# Patient Record
Sex: MALE | Race: WHITE
[De-identification: names, ages, dates, MRNs, and addresses within clinical notes are randomized per-mention and may not be internally consistent; named-entity substitution may affect disease eponyms.]

---

## 2017-11-06 LAB
BASOPHILS # BLD AUTO: 0.1 10*3/UL (ref 0–0.1)
BASOPHILS NFR BLD AUTO: 0.7 % (ref 0–1)
DEPRECATED NEUTROPHILS # BLD AUTO: 4.5 10*3/UL (ref 2.1–6.9)
EOSINOPHIL # BLD AUTO: 0.2 10*3/UL (ref 0–0.4)
EOSINOPHIL NFR BLD AUTO: 2.4 % (ref 0–6)
ERYTHROCYTE [DISTWIDTH] IN CORD BLOOD: 14.7 % (ref 11.7–14.4)
HCT VFR BLD AUTO: 46.5 % (ref 38.2–49.6)
HGB BLD-MCNC: 15.5 G/DL (ref 14–18)
LYMPHOCYTES # BLD: 1.7 10*3/UL (ref 1–3.2)
LYMPHOCYTES NFR BLD AUTO: 24.5 % (ref 18–39.1)
MCH RBC QN AUTO: 28.1 PG (ref 28–32)
MCHC RBC AUTO-ENTMCNC: 33.3 G/DL (ref 31–35)
MCV RBC AUTO: 84.4 FL (ref 81–99)
MONOCYTES # BLD AUTO: 0.5 10*3/UL (ref 0.2–0.8)
MONOCYTES NFR BLD AUTO: 6.8 % (ref 4.4–11.3)
NEUTS SEG NFR BLD AUTO: 65.3 % (ref 38.7–80)
PLATELET # BLD AUTO: 147 X10E3/UL (ref 140–360)
RBC # BLD AUTO: 5.51 X10E6/UL (ref 4.3–5.7)

## 2017-11-08 ENCOUNTER — HOSPITAL ENCOUNTER (OUTPATIENT)
Dept: HOSPITAL 88 - OR | Age: 67
Discharge: HOME | End: 2017-11-08
Attending: INTERNAL MEDICINE
Payer: MEDICARE

## 2017-11-08 DIAGNOSIS — Z01.810: ICD-10-CM

## 2017-11-08 DIAGNOSIS — K20.9: ICD-10-CM

## 2017-11-08 DIAGNOSIS — K21.9: ICD-10-CM

## 2017-11-08 DIAGNOSIS — Z79.4: ICD-10-CM

## 2017-11-08 DIAGNOSIS — E11.9: ICD-10-CM

## 2017-11-08 DIAGNOSIS — I10: ICD-10-CM

## 2017-11-08 DIAGNOSIS — I82.890: Primary | ICD-10-CM

## 2017-11-08 DIAGNOSIS — K29.70: ICD-10-CM

## 2017-11-08 DIAGNOSIS — Z01.812: ICD-10-CM

## 2017-11-08 DIAGNOSIS — K31.7: ICD-10-CM

## 2017-11-08 PROCEDURE — 93005 ELECTROCARDIOGRAM TRACING: CPT

## 2017-11-08 PROCEDURE — 36415 COLL VENOUS BLD VENIPUNCTURE: CPT

## 2017-11-08 PROCEDURE — 85025 COMPLETE CBC W/AUTO DIFF WBC: CPT

## 2017-11-08 PROCEDURE — 43239 EGD BIOPSY SINGLE/MULTIPLE: CPT

## 2017-11-08 NOTE — OPERATIVE REPORT
DATE OF PROCEDURE:  November 08, 2017 



REFERRING PHYSICIAN:  Dr. Julia Kim.  



PROCEDURE PERFORMED:  EGD with biopsies and polypectomy.  



INDICATIONS FOR  EGD:  Patient with history of splenic vein thrombosis.  

EGD is being carried out to check for gastric varices.



MEDICATION:  Patient was done under MAC.  Please see anesthesiologist's 

note.



PROCEDURE:  With the patient in left lateral decubitus position, flexible 

fiberoptic Olympus gastroscope was introduced into the esophagus under 

direct visualization without any difficulty.  There was some patchy 

erythema noted in the distal esophagus.  Minute tongues of velvety red 

mucosa were noted to extend proximally from the GE junction and biopsies 

were obtained to rule out Peraza's.  The scope was then advanced with ease 

into the stomach.  Mucosa overlying the antrum revealed some patchy intense 

erythema and low-grade to moderate edema, and biopsies were obtained and 

sent to stain for H. pylori.  Some hyperplastic appearing polyps were also 

noted in the body of the stomach and some were partially excised with a 

cold biopsy forceps.  The pylorus was of normal contour and shape.  Was 

intubated with ease, and the scope was advanced all the way to the 2nd 

portion of the duodenum.  The scope was then withdrawn slowly, and the 

mucosa overlying the proximal 2nd portion as well as the duodenal bulb 

appeared to be within normal limits.  The scope was then withdrawn back 

into the stomach and retroflexed, and the mucosa overlying the fundus and 

the cardia appeared to be within normal limits.  The scope was then 

straightened out.  The stomach was decompressed.  The scope was 

subsequently withdrawn.  Patient tolerated the procedure well.



IMPRESSION  

1. Distal esophagitis, mild.

2. Rule out Peraza's esophagus.

3. Gastritis biopsied.  Biopsy sent stain for Helicobacter pylori.

4. Gastric polyps.  Some partially excised with a cold biopsy forceps.

5. No evidence of gastric varices.    



PLAN:  Follow up histology.  Continue Prevacid 30 mg 1 p.o. a.c. b.i.d.





DD:  11/08/2017 10:43

DT:  11/08/2017 11:03

Job#:  [id number] ERICKSON



cc:JULIA KIM MD

## 2018-11-09 LAB
BASOPHILS # BLD AUTO: 0.1 10*3/UL (ref 0–0.1)
BASOPHILS NFR BLD AUTO: 0.7 % (ref 0–1)
DEPRECATED NEUTROPHILS # BLD AUTO: 5.8 10*3/UL (ref 2.1–6.9)
EOSINOPHIL # BLD AUTO: 0.2 10*3/UL (ref 0–0.4)
EOSINOPHIL NFR BLD AUTO: 2.4 % (ref 0–6)
ERYTHROCYTE [DISTWIDTH] IN CORD BLOOD: 14.3 % (ref 11.7–14.4)
HCT VFR BLD AUTO: 47.6 % (ref 38.2–49.6)
HGB BLD-MCNC: 16.2 G/DL (ref 14–18)
LYMPHOCYTES # BLD: 2.4 10*3/UL (ref 1–3.2)
LYMPHOCYTES NFR BLD AUTO: 25.9 % (ref 18–39.1)
MCH RBC QN AUTO: 28.9 PG (ref 28–32)
MCHC RBC AUTO-ENTMCNC: 34 G/DL (ref 31–35)
MCV RBC AUTO: 84.8 FL (ref 81–99)
MONOCYTES # BLD AUTO: 0.6 10*3/UL (ref 0.2–0.8)
MONOCYTES NFR BLD AUTO: 6.8 % (ref 4.4–11.3)
NEUTS SEG NFR BLD AUTO: 63.9 % (ref 38.7–80)
PLATELET # BLD AUTO: 179 X10E3/UL (ref 140–360)
RBC # BLD AUTO: 5.61 X10E6/UL (ref 4.3–5.7)

## 2018-11-14 ENCOUNTER — HOSPITAL ENCOUNTER (OUTPATIENT)
Dept: HOSPITAL 88 - OR | Age: 68
Discharge: HOME | End: 2018-11-14
Attending: INTERNAL MEDICINE
Payer: MEDICARE

## 2018-11-14 VITALS — DIASTOLIC BLOOD PRESSURE: 57 MMHG | SYSTOLIC BLOOD PRESSURE: 94 MMHG

## 2018-11-14 DIAGNOSIS — Z86.010: ICD-10-CM

## 2018-11-14 DIAGNOSIS — Z12.11: Primary | ICD-10-CM

## 2018-11-14 DIAGNOSIS — D12.0: ICD-10-CM

## 2018-11-14 DIAGNOSIS — K29.70: ICD-10-CM

## 2018-11-14 DIAGNOSIS — Z01.810: ICD-10-CM

## 2018-11-14 DIAGNOSIS — K86.1: ICD-10-CM

## 2018-11-14 DIAGNOSIS — K64.8: ICD-10-CM

## 2018-11-14 DIAGNOSIS — I10: ICD-10-CM

## 2018-11-14 DIAGNOSIS — K21.0: ICD-10-CM

## 2018-11-14 DIAGNOSIS — K57.30: ICD-10-CM

## 2018-11-14 DIAGNOSIS — K63.5: ICD-10-CM

## 2018-11-14 DIAGNOSIS — Z01.812: ICD-10-CM

## 2018-11-14 PROCEDURE — 85025 COMPLETE CBC W/AUTO DIFF WBC: CPT

## 2018-11-14 PROCEDURE — 36415 COLL VENOUS BLD VENIPUNCTURE: CPT

## 2018-11-14 PROCEDURE — 88305 TISSUE EXAM BY PATHOLOGIST: CPT

## 2018-11-14 PROCEDURE — 82948 REAGENT STRIP/BLOOD GLUCOSE: CPT

## 2018-11-14 PROCEDURE — 45384 COLONOSCOPY W/LESION REMOVAL: CPT

## 2018-11-14 PROCEDURE — 45385 COLONOSCOPY W/LESION REMOVAL: CPT

## 2018-11-14 PROCEDURE — 93005 ELECTROCARDIOGRAM TRACING: CPT

## 2018-11-14 PROCEDURE — 45378 DIAGNOSTIC COLONOSCOPY: CPT

## 2018-11-14 NOTE — OPERATIVE REPORT
DATE OF PROCEDURE:  November 14, 2018 



REFERRING PHYSICIAN:  Dr. GEORGE Kim  



PROCEDURE PERFORMED:  Colonoscopy and polypectomy.  



INDICATIONS FOR COLONOSCOPY:  Surveillance colonoscopy, personal history of 

colon polyps.



MEDICATION:  Patient was done under MAC.  Please see anesthesiologist's 

note.



PROCEDURE:  With the patient in the left lateral decubitus position, the 

flexible fiberoptic Olympus colonoscope was inserted into the rectum with 

ease and advanced all the way to the cecum.  One polyp was snared from the 

cecum.  Diverticular disease was noted throughout the colon.  One polyp was 

hot biopsied from the ascending, and two polyps were hot biopsied from the 

transverse colon.  Other than for diverticulosis, the descending and 

sigmoid appeared to be within normal limits.  The scope was then 

retroflexed into the distal rectum, and moderate-size internal hemorrhoids 

were noted, none of which was actively bleeding.  The scope was then 

straightened out.  It was subsequently withdrawn.  Patient tolerated the 

procedure well.



IMPRESSION

1. Cecal polyp, snared.

2. Ascending colon polyp, hot biopsied.

3. Transverse colon polyps times 2, hot biopsied.

4. Pandiverticulosis.  

5. Internal hemorrhoids, none actively bleeding.  



PLAN:  Follow up histology.  Initiate high-fiber, low-fat diet.  Initiate 

high-fiber supplement.  

Patient will need a followup colonoscopy in 3 years.  







DD:  11/14/2018 09:03

DT:  11/14/2018 11:56

Job#:  [id number] 



cc:JULIA KIM MD

## 2018-11-14 NOTE — XMS REPORT
Clinical Summary

                             Created on: 2018



Rafita Maher

External Reference #: [id number]

: 1950

Sex: Male



Demographics







                          Address                   [address] [place]  [address]

 

                          Home Phone                [phone]

 

                          Preferred Language        English

 

                          Marital Status            

 

                          Rastafarian Affiliation     Unknown

 

                          Race                      White

 

                          Ethnic Group              Non-





Author







                          Author                    [facility]

 

                          Organization              [facility]

 

                          Address                   Unknown

 

                          Phone                     Unavailable







Support







                Name            Relationship    Address         Phone

 

                Olga Conner    [employer]            Unknown         [phone]







Care Team Providers







                    Care Team Member Name    Role                Phone

 

                    Arcihe Vera MD    PCP                 [phone]







Allergies

No Known Allergies



Medications







                          End Date                  Status



              Medication     Sig          Dispensed     Refills      Start  



                                         Date  

 

                                                    Active



                     insulin GLARGINE (LANTUS     Inject 20           0   



                           SOLOSTAR) 100 unit/mL     Units under     



                           injection (pen)           the skin     



                                         every     



                                         morning.     

 

                                                    Active



                     olmesartan-amlodipin-hcth     Take 1 tablet       0   



                           iazid (TRIBENZOR)         by mouth     



                           40-5-12.5 mg tablet       every     



                                         morning.     

 

                                                    Active



                     lansoprazole (PREVACID)     Take 30 mg by       0   



                           30 MG capsule             mouth 2 (two)     



                                         times a day.     

 

                                                    Active



                     pancrelipase,       Take 24,000         0   



                           lipase-protease-amylase,     Units by     



                           (CREON) 24,000-76,000     mouth 3     



                           -120,000 unit             (three) times     



                           capsule,delayed           a day with     



                           release(DR/EC) capsule     meals. TAKES     



                                         3 TABLETS 3     



                                         TIMES A DAY     



                                         WITH MEALS     

 

                                                    Active



                     testosterone cypionate     Inject 200          0   



                           200 mg/mL kit             mg/mL into     



                                         the shoulder,     



                                         thigh, or     



                                         buttocks     



                                         every 14     



                                         (fourteen)     



                                         days. EVERY     



                                          AND      



                                         OF THE MONTH     

 

                                                    Active



                     UNABLE TO FIND      Take 3              0   



                                         tablets by     



                                         mouth every     



                                         morning.     



                                         Nugenix     

 

                                                    Active



                     BENEFIBER, GUAR GUM, ORAL     Take 2 Doses        0   



                                         by mouth     



                                         every     



                                         morning. 2     



                                         TBSP     

 

                                                    Active



                     MULTIVITAMIN ORAL     Take 1 tablet       0   



                                         by mouth     



                                         every     



                                         morning.     

 

                                                    Active



                     aspirin (ECOTRIN) 81 MG     Take 81 mg by       0   



                           enteric coated tablet     mouth every     



                                         morning.     

 

                                                    Active



                     glucosamine-chondroitin     Chew 2              0   



                           750-600 mg                tablets every     



                           tablet,chewable           morning.     

 

                                                    Active



                     omega-3 fatty acids-fish     Take 2              0   



                           oil (FISH OIL) 360-1,200     tablets by     



                           mg capsule                mouth 2 (two)     



                                         times a day.     

 

                                                    Active



                     insulin GLARGINE (LANTUS     Inject 60           0   



                           SOLOSTAR) 100 unit/mL     Units under     



                           injection (pen)           the skin     



                                         nightly.     

 

                                                    Active



                     insulin lispro (HUMALOG)     Inject 15           0   



                           100 unit/mL injection pen     Units under     



                                         the skin     



                                         daily before     



                                         dinner.     

 

                                                    Active



                     insulin lispro (HUMALOG)     Inject 10           0   



                           100 unit/mL injection pen     Units under     



                                         the skin     



                                         daily before     



                                         breakfast.     

 

                                                    Active



                     ERGOCALCIFEROL, VITAMIN     Take 1 tablet       0   



                           D2, (VITAMIN D2 ORAL)     by mouth     



                                         every     



                                         morning.     

 

                          2017                Discontinued



                     insulin lispro (HumaLOG)     Inject 10           0   



                           100 unit/mL injection     Units under     



                                         the skin     



                                         every     



                                         morning.     

 

                          2017                Discontinued



                     cyanocobalamin 1,000     Inject 1,000        0   



                           mcg/mL injection          mcg into the     



                                         shoulder,     



                                         thigh, or     



                                         buttocks     



                                         once.     

 

                          2018                



              HYDROcodone-acetaminophen     Take 1 tablet     31 tablet     0              



                     (NORCO) 5-325 mg per     by mouth            7  



                           tablet                    every 6 (six)     



                                         hours as     



                                         needed for     



                                         moderate pain     



                                         for up to 21     



                                         days. Max     



                                         Daily Amount:     



                                         4 tablets     







Active Problems







 



                           Problem                   Noted Date

 

 



                           Status post umbilical hernia repair, follow-up exam     2017

 

 



                                         Last Assessment & Plan:



                                         The patient is doing well status post laparoscopic umbilical hernia repair



                                         with mesh.  There is no longer any lifting restrictions.  The patient will



                                         return to clinic as needed.

 

 



                           Diabetes                  2017

 

 



                           High blood pressure       2017

 

 



                           GERD (gastroesophageal reflux disease)     2017

 

 



                           Pancreatitis              2017

 

 



                           Splenic vein thrombosis     2017

 

 



                                         Last Assessment & Plan:



                                         Patient has known congestive splenomegaly with extensive collateralization



                                         from splenic vein thrombosis and is considering splenectomy at the same



                                         time as umbilical hernia repair. We will obtain the MRI disk at [facility]



                                         for this report and discuss these MRI finding with his Gastroenterologist,



                                         Dr. James Diggs of [place] [phone].



                                         .







Resolved Problems







  



                     Problem             Noted Date          Resolved Date

 

  



                     Umbilical hernia without obstruction and without gangrene     2017



 

 



                                         Last Assessment & Plan:



                                         Patient has a small, non-painful, reducible umbilical hernia. We discussed



                                         the risks, benefits and alternatives to laparoscopic surgical repair of



                                         this hernia and he wishes to proceed.







Encounters







                          Care Team                 Description



                     Date                Type                Specialty  

 

                                        



Marques Tomas MD                      Status post umbilical hernia repair, follow-up exam (Primary 

Dx)



                     2018          Office Visit        General Surgery  

 

                                        



Marques Tomas MD                      Status post umbilical hernia repair, follow-up exam (Primary 

Dx)



                     2017          Office Visit        General Surgery  

 

                                        



Marques Tomas MD                      REPAIR, HERNIA, UMBILICAL, LAPAROSCOPIC



                     2017          Surgery             General Surgery  

 

                                        



Josue Ron Jr., MD                  



                     2017          Anesthesia          General Surgery  



                                         Event   

 

                                        



Marques Tomas MD                       



                     2017          Hospital            General Surgery  



                                         Encounter   

 

                                        



Marques Tomas MD                      Preop testing (Primary Dx); 

Umbilical hernia without obstruction or gangrene



                     2017          Pre-Admit           Pre-Admission Testing  



                                         Testing   



                                         Appointment   



after 2017



Family History







   



                 Medical History     Relation        Name            Comments

 

   



                           No Known Problems         Brother  

 

   



                           No Known Problems         Brother  

 

   



                           Diabetes                  Brother  

 

   



                           Kidney disease            Daughter  

 

   



                           No Known Problems         Daughter  

 

   



                           Brain cancer              Father  

 

   



                           Lung cancer               Father  

 

   



                           No Known Problems         Maternal  



                                         Grandfather  

 

   



                           No Known Problems         Maternal  



                                         Grandmother  

 

   



                           Blood Clots               Mother  

 

   



                           No Known Problems         Paternal  



                                         Grandfather  

 

   



                           No Known Problems         Paternal  



                                         Grandmother  

 

   



                           Diabetes                  Sister  









   



                 Relation        Name            Status          Comments

 

   



                           Brother                   Alive 

 

   



                           Brother                   Alive 

 

   



                           Brother                   Alive 

 

   



                           Daughter                  Alive 

 

   



                           Daughter                  Alive 

 

   



                           Father                     

 

   



                           Maternal Grandfather       

 

   



                           Maternal Grandmother       

 

   



                           Mother                     

 

   



                           Paternal Grandfather       

 

   



                           Paternal Grandmother       

 

   



                           Sister                    Alive 







Social History







                                        Date



                 Tobacco Use     Types           Packs/Day       Years Used 

 

                                        Quit: 



                 Former Smoker     Cigarettes      1.5             8 

 

    



                                         Smokeless Tobacco: Never   



                                         Used   









   



                 Alcohol Use     Drinks/Week     oz/Week         Comments

 

   



                           No                        QUIT , - used to be heavy drinker.









 



                           Sex Assigned at Birth     Date Recorded

 

 



                                         Not on file 









                                        Industry



                           Job Start Date            Occupation 

 

                                        Not on file



                           Not on file               Not on file 









                                        Travel End



                           Travel History            Travel Start 

 





                                         No recent travel history available.







Last Filed Vital Signs







                                        Time Taken



                           Vital Sign                Reading 

 

                                        2018 10:37 AM CST



                           Blood Pressure            136/70 

 

                                        2018 10:37 AM CST



                           Pulse                     85 

 

                                        2018 10:37 AM CST



                           Temperature               36.5 C (97.7 F) 

 

                                        2018 10:37 AM CST



                           Respiratory Rate          16 

 

                                        2017 12:04 PM CST



                           Oxygen Saturation         96% 

 

                                        -



                           Inhaled Oxygen            - 



                                         Concentration  

 

                                        2018 10:37 AM CST



                           Weight                    115 kg (252 lb 8 oz) 

 

                                        2018 10:37 AM CST



                           Height                    182.9 cm (6') 

 

                                        2018 10:37 AM CST



                           Body Mass Index           34.25 







Plan of Treatment







   



                 Health Maintenance     Due Date        Last Done       Comments

 

   



                           DIABETIC RETINAL EYE EXAM     1950  

 

   



                           DIABETIC FOOT EXAM        1961  

 

   



                           URINE MICROALBUMIN        1961  

 

   



                           COLON CANCER SCREENING     2001  

 

   



                           SHINGRIX VACCINE (1 of 2)     2001  

 

   



                           ZOSTER VACCINE            2011  

 

   



                           PNEUMOCOCCAL              2016  



                                         POLYSACCHARIDE VACCINE   



                                         AGE 65 AND OVER   

 

   



                           PNEUMOCOCCAL-13           2016  

 

   



                     INFLUENZA VACCINE     2018          10/25/2011, 2010, 2010, 



                                         Additional history exists 







Implants







                    Device Identifier    Shelf Expiration Date    Model / Serial / Lot



                 Implanted       Type            Area            Manufactur   



                                         er   

 

                                        2018          [id number] /

 /

[id number]



                 System Fixation Laparo Absorb 30     Surgical        N/A: N/A        [employer]   



                           Fastnr Optifix - [id number]     Implants;     



                           Implanted: 2017 (Quantity not     Expanders;     



                           on file)                  Extenders;     



                                         Surgical     



                                         Wires     

 

                                        2019          [id number] /

 /

[id number]



                 Mesh Hrnia Rpr Ventralight St 4x6in     Surgical        N/A: N/A        [employer]   



                           Elptcl Ppe Ventrl - [id number]     Mesh or     



                           Implanted: 2017 (Quantity not     Tissue     



                           on file)                  Barrier     



                                         Products     







Procedures







                                        Comments



                 Procedure Name     Priority        Date/Time       Associated Diagnosis 

 

                                        



Results for this procedure are in the results section.



                     POC GLUCOSE         Routine             2017  



                                         11:55 AM CST  

 

                                        



Results for this procedure are in the results section.



                     POC GLUCOSE         Routine             2017  



                                         10:58 AM CST  

 

                                         



                     MA AN ELECTIVE      Routine             2017  



                           ENDOTRACHEAL AIRWAY       8:41 AM CST  

 

  



                                         Procedure



                                         Note -



                                         Josue Ron Jr., MD -



                                         2017



                                         8:40 AM



                                         CST



                                         Airway



                                         Date/Time:



                                         2017



                                         8:19 AM



                                         Performed



                                         by:



                                         JOSUE RON JR



                                         Authorized



                                         by:



                                         JOSUE RON JR





                                         Location:



                                         OR



                                         Urgency:



                                         Elective



                                         Difficult



                                         Airway: No



                                         Anesthesio



                                         logist:



                                         JOSUE RON JR



                                         Performed



                                         by:



                                         anesthesio



                                         logist



                                         Preoxygena



                                         felipe with



                                         100% O2:



                                         Yes



                                         C-spine



                                         Precaution



                                         s



                                         Maintained



                                         Throughout



                                         : Yes



                                         Mask



                                         Ventilatio



                                         n:  Easy



                                         mask



                                         Final



                                         Airway



                                         Type:



                                         Endotrache



                                         al airway



                                         Final



                                         Endotrache



                                         al Airway:



                                         ETT



                                         Cuffed:



                                         Yes



                                         Technique



                                         Used:



                                         Direct



                                         laryngosco



                                         py



                                         Devices/Me



                                         thods Used



                                         in



                                         Placement:



                                         Intubatin



                                         g stylet



                                         Insertion



                                         Site:



                                         Oral



                                         ETT Size



                                         (mm):  8.0



                                         Cuff at



                                         minimum



                                         occlusion



                                         pressure:



                                         Yes



                                         Measured



                                         from:



                                         Teeth



                                         ETT to



                                         Teeth



                                         (cm):  23



                                         Placement



                                         Verified



                                         by: CO2



                                         detection



                                         and direct



                                         visualizat



                                         ion



                                         Laryngosco



                                         pic view:



                                         Grade I -



                                         full view



                                         of glottis



                                         Rapid



                                         Sequence



                                         Induction



                                         (RSI): No





                                         Modified



                                         RSI: No



                                         Number of



                                         Attempts



                                         at



                                         Approach:



                                         1



 

                                         



                     REPAIR, HERNIA,      2017          Umbilical hernia without 



                     UMBILICAL, LAPAROSCOPIC      8:00 AM CST         obstruction and without 



                                         gangrene 

 

                                        



Results for this procedure are in the results section.



                     POC GLUCOSE         Routine             2017  



                                         7:30 AM CST  

 

                                        



Results for this procedure are in the results section.



                 ECG PRE/POST OP     Routine         2017      Preop testing 



                                         11:45 AM CST  

 

                                        



Results for this procedure are in the results section.



                 CBC HEMOGRAM     Routine         2017      Umbilical hernia without 



                           10:50 AM CST              obstruction or gangrene 

 

                                        



Results for this procedure are in the results section.



                 HEMOGLOBIN A1C     Routine         2017      Preop testing 



                                         10:31 AM CST  

 

                                        



Results for this procedure are in the results section.



                 HEPATIC FUNCTION PANEL     Routine         2017      Preop testing 



                                         10:31 AM CST  

 

                                        



Results for this procedure are in the results section.



                     ZZESTIMATED GFR     Routine             2017  



                                         10:29 AM CST  

 

                                        



Results for this procedure are in the results section.



                 BASIC METABOLIC PANEL     Routine         2017      Umbilical hernia without 



                           10:29 AM CST              obstruction or gangrene 



after 2017



Results

* POC glucose (2017 11:55 AM CST)



Only the most recent of 3 results within the time period is included.





   



                 Component       Value           Ref Range       Performed At

 

   



                 POC glucose     (H)             65 - 99 mg/dL     [facility] DEPARTMENT OF



                           Comment:                  PATHOLOGY AND



                           No Action Needed          GENOMIC MEDICINE



                                         Meter ID: [id number]  



                                         : Co My Dien T  









   



                 Performing Organization     Address         City/[facility]     Phone Number

 

   



                     [facility] DEPARTMENT OF     [address] [place] [address] 



                                         PATHOLOGY AND GENOMIC   



                                         MEDICINE   





* ECG Pre/Post Op (2017 11:45 AM CST)





   



                 Component       Value           Ref Range       Performed At

 

   



                           Ventricular rate          [facility] MUSE

 

   



                           Atrial rate               [facility] MUSE

 

   



                           MA interval               [facility] MUSE

 

   



                           QRSD interval             [facility] MUSE

 

   



                           QT interval               [facility] MUSE

 

   



                           QTC interval              [facility]

 

   



                           P axis 1                  [facility]

 

   



                           QRS axis 1                [facility] MUSE

 

   



                           T wave axis               [facility] MUSE

 

   



                           EKG impression            [facility] MUSE









   



                 Performing Organization     Address         [facility]     Phone Number

 

   



                     [facility] MUSE            [address] [place] [address] 





* CBC hemogram (2017 10:50 AM CST)





   



                 Component       Value           Ref Range       Performed At

 

   



                     WBC                 4.50 - 11.00 k/uL     [facility] DEPARTMENT OF



                                         PATHOLOGY AND



                                         GENOMIC MEDICINE

 

   



                     RBC                 4.40 - 6.00 m/uL     [facility] DEPARTMENT OF



                                         PATHOLOGY AND



                                         GENOMIC MEDICINE

 

   



                     HGB                 14.0 - 18.0 g/dL     [facility] DEPARTMENT OF



                                         PATHOLOGY AND



                                         GENOMIC MEDICINE

 

   



                     HCT                 41.0 - 51.0 %       [facility] DEPARTMENT OF



                                         PATHOLOGY AND



                                         GENOMIC MEDICINE

 

   



                     MCV                 82.0 - 100.0 fL     [facility] DEPARTMENT OF



                                         PATHOLOGY AND



                                         GENOMIC MEDICINE

 

   



                     MCH                 27.0 - 34.0 pg      [facility] DEPARTMENT OF



                                         PATHOLOGY AND



                                         GENOMIC MEDICINE

 

   



                     MCHC                31.0 - 37.0 g/dL     [facility] DEPARTMENT OF



                                         PATHOLOGY AND



                                         GENOMIC MEDICINE

 

   



                     RDW - SD            37.0 - 55.0 fL      [facility] DEPARTMENT OF



                                         PATHOLOGY AND



                                         GENOMIC MEDICINE

 

   



                     MPV                 8.8 - 13.2 fL       [facility] DEPARTMENT OF



                                         PATHOLOGY AND



                                         GENOMIC MEDICINE

 

   



                     Platelet count      150 - 400 k/uL      [facility] DEPARTMENT OF



                                         PATHOLOGY AND



                                         GENOMIC MEDICINE

 

   



                     Nucleated RBC       /100 WBC            [facility] DEPARTMENT OF



                                         PATHOLOGY AND



                                         GENOMIC MEDICINE













                                         Specimen

 





                                         Blood









   



                 Performing Organization     Address         [facility]     Phone Number

 

   



                     [place] [address] 



                                         PATHOLOGY AND GENOMIC   



                                         MEDICINE   





* Hemoglobin A1c (2017 10:31 AM CST)





   



                 Component       Value           Ref Range       Performed At

 

   



                 Hemoglobin A1C     (H)             4.0 - 5.6 %     [facility] DEPARTMENT OF



                           Comment:                  PATHOLOGY AND



                           HbA1c cutoffs for diagnosing      [facility]



                                         diabetes:  



                                         4.0% - 5.6%=normal  



                                         5.7% - 6.4%=increased risk for  



                                         diabetes (prediabetes)

  



                                         >=6.5%=diabetes  



                                         Goals for glycemic control  



                                         (ADA 2016)  



                                         < 7.0%Target for  



                                         nonpregnant adults with  



                                         diabetes.  



                                         More or less stringent targets  



                                         may be appropriate for  



                                         individual patients.  



                                         <7.5% Target for Children  



                                         and adolescents with type 1  



                                         diabetes.  













                                         Specimen

 





                                         Blood









   



                 Performing Organization     Address         [facility]     Phone Number

 

   



                     [place] [address] 



                                         PATHOLOGY AND GENOMIC   



                                         MEDICINE   





* Hepatic function panel (2017 10:31 AM CST)





   



                 Component       Value           Ref Range       Performed At

 

   



                     Albumin             3.5 - 5.0 g/dL      [facility] DEPARTMENT OF



                                         PATHOLOGY AND



                                         GENOMIC MEDICINE

 

   



                     Total bilirubin      0.0 - 1.2 mg/dL     [facility] DEPARTMENT OF



                                         PATHOLOGY AND



                                         GENOMIC MEDICINE

 

   



                     Bilirubin direct      0.0 - 0.3 mg/dL     [facility] DEPARTMENT OF



                                         PATHOLOGY AND



                                         GENOMIC MEDICINE

 

   



                     Alkaline phosphatase      40 - 129 U/L        [facility] DEPARTMENT OF



                                         PATHOLOGY AND



                                         GENOMIC MEDICINE

 

   



                 Protein         Comment:        6.3 - 8.3 g/dL     [facility] DEPARTMENT OF



                                 PATHOLOGY AND



                            4.6-7.0 g/dL        GENOMIC MEDICINE



                                         1  



                                         week  



                                          4.4-7.6 g/dL  



                                         7  



                                         months-1year  



                                         5.1-7.3 g/dL  



                                         1-2  



                                         years5.6-7  



                                         .5 g/dL  



                                         >3  



                                         years6.0-8  



                                         .0 g/dL  



                                           



                                          6.3-8.3 g/dL  

 

   



                     ALT                 5 - 50 U/L          [facility] DEPARTMENT OF



                                         PATHOLOGY AND



                                         GENOMIC MEDICINE

 

   



                     AST                 10 - 50 U/L         [facility] DEPARTMENT OF



                                         PATHOLOGY AND



                                         GENOMIC MEDICINE













                                         Specimen

 





                                         Plasma specimen









   



                 Performing Organization     Address         [facility]     Phone Number

 

   



                     [place] [address] 



                                         PATHOLOGY AND [facility]   



                                         MEDICINE   





* Estimated GFR (2017 10:29 AM CST)





   



                 Component       Value           Ref Range       Performed At

 

   



                     GFR Non Af Amer      mL/min/1.73 m2      [facility] DEPARTMENT OF



                                         PATHOLOGY AND



                                         GENOMIC MEDICINE

 

   



                 GFR Af Amer     Comment:        mL/min/1.73 m2     [facility] DEPARTMENT OF



                           Chronic kidney disease: <60      PATHOLOGY AND



                           mL/min/1.73m2             [facility] MEDICINE



                                         Kidney failure: <15  



                                         mL/min/1.73m2  



                                         The estimated GFR is  



                                         calculated from the  



                                         IDMS-traceable Modification of  



                                         Diet  



                                         in Renal Disease Equation. The  



                                         accuracy of the calculation is  



                                         poor when the  



                                         creatinine is normal.  



                                         Calculated values >90  



                                         mL/min/1.73m2 are not  



                                         reported.  



                                         This equation has not been  



                                         validated in children (<18  



                                         years), pregnant  



                                         women, the elderly (>70  



                                         years), or ethnic groups other  



                                         than Caucasians and  



                                          Americans.  













                                         Specimen

 





                                         Plasma specimen









   



                 Performing Organization     Address         [facility]     Phone Number

 

   



                     [facility]     [address] [place] [address] 



                                         PATHOLOGY AND [facility]   



                                         MEDICINE   





* Basic metabolic panel (2017 10:29 AM CST)





   



                 Component       Value           Ref Range       Performed At

 

   



                     Sodium              135 - 148 mEq/L     [facility] DEPARTMENT OF



                                         PATHOLOGY AND



                                         GENOMIC MEDICINE

 

   



                     Potassium           3.5 - 5.0 mEq/L     [facility] DEPARTMENT OF



                                         PATHOLOGY AND



                                         GENOMIC MEDICINE

 

   



                     Chloride            98 - 112 mEq/L      [facility] DEPARTMENT OF



                                         PATHOLOGY AND



                                         GENOMIC MEDICINE

 

   



                     CO2                 24 - 31 mEq/L       [facility] DEPARTMENT OF



                                         PATHOLOGY AND



                                         GENOMIC MEDICINE

 

   



                 Anion gap       Comment:        7 - 15 mEq/L     [facility] DEPARTMENT OF



                           Starting from       PATHOLOGY AND



                           , anion gap calculation      GENOMIC MEDICINE



                                         no longer incorporates  



                                         potassium. Please note the  



                                         change.  

 

   



                     BUN                 8 - 23 mg/dL        [facility] DEPARTMENT OF



                                         PATHOLOGY AND



                                         GENOMIC MEDICINE

 

   



                     Creatinine          0.7 - 1.2 mg/dL     [facility] DEPARTMENT OF



                                         PATHOLOGY AND



                                         GENOMIC MEDICINE

 

   



                 Glucose         (H)             65 - 99 mg/dL     [facility] DEPARTMENT OF



                                         PATHOLOGY AND



                                         GENOMIC MEDICINE

 

   



                     Calcium             8.8 - 10.2 mg/dL     [facility] DEPARTMENT OF



                                         PATHOLOGY AND



                                         GENOMIC MEDICINE













                                         Specimen

 





                                         Plasma specimen









   



                 Performing Organization     Address         City/State/Zipcode     Phone Number

 

   



                     [facility] DEPARTMENT OF     [address] [place] [address] 



                                         PATHOLOGY AND GENOMIC   



                                         MEDICINE   





after 2017



Insurance







     



            Payer      Benefit     Subscriber ID     Type       Phone      Address



                                         Plan /    



                                         Group    

 

     



                 [employer] MEDICARE     [employer]          xxxxxxxxx       PPO  



                                         MEDICARE    



                                         PPO/PFFS/E    



                                         [facility]    









     



            Guarantor Name     Account     Relation to     Date of     Phone      Billing Address



                     Type                Patient             Birth  

 

     



            Rafita Maher     Personal/F     Self       1950     [phone] [address]               ([place])              [place] [address]







Advance Directives





Patient has advance care planning documents on file. For more information, josé miguel cummins contact:



Ray Michael



[address] [place] [address]

## 2018-11-27 ENCOUNTER — HOSPITAL ENCOUNTER (OUTPATIENT)
Dept: HOSPITAL 88 - MRI | Age: 68
End: 2018-11-27
Attending: INTERNAL MEDICINE
Payer: MEDICARE

## 2018-11-27 DIAGNOSIS — K86.1: Primary | ICD-10-CM

## 2018-11-27 LAB
BUN SERPL-MCNC: 17 MG/DL (ref 7–26)
BUN/CREAT SERPL: 20 (ref 6–25)
EGFRCR SERPLBLD CKD-EPI 2021: > 60 ML/MIN (ref 60–?)

## 2018-11-27 PROCEDURE — 36415 COLL VENOUS BLD VENIPUNCTURE: CPT

## 2018-11-27 PROCEDURE — 82565 ASSAY OF CREATININE: CPT

## 2018-11-27 PROCEDURE — 74183 MRI ABD W/O CNTR FLWD CNTR: CPT

## 2018-11-27 PROCEDURE — 84520 ASSAY OF UREA NITROGEN: CPT

## 2018-11-28 NOTE — DIAGNOSTIC IMAGING REPORT
History: Chronic pancreatitis



Comparison: MRI abdomen 7/13/2017.



Technique: Multiplanar, multisequence images of the abdomen were obtained

before and after the administration of  20 cc of gadolinium.  3D volume

rendered reformation images of the biliary tree were performed.  



Findings: 

Biliary tree: The intrahepatic and intrahepatic bile ducts, cystic duct, and

common bile duct are normal in morphology.



Pancreas duct: Chronic dilatation of the main pancreas duct to 9 mm. 



Liver: Normal T2 signal. The right lobe measures 19 cm in length, stable. There

is no evidence of mass. 

Gallbladder: Present. No evidence of gallstone or mural thickening.

Spleen: Measures 18 cm in length. Normal signal. No mass.

Pancreas: Diffusely atrophic. A nonenhancing lesion in the uncinate on previous

exam is poorly visualized. There are no new pancreas lesions, enhancing or

otherwise.

Kidneys: No hydronephrosis. No enhancing renal lesions. Tiny nonenhancing

cortical lesions are stable and are likely cysts.

Adrenal glands: No mass 



Lymph nodes: No enlarged abdominal or retroperitoneal lymph nodes.  



Peritoneum/retroperitoneum: No free fluid or fluid collection.



Vasculature: The main portal vein measures 19 mm in diameter without evidence

of thrombus. Chronic splenic vein thrombus is redemonstrated with numerous

collaterals. The IVC and hepatic veins are widely patent. The aorta is normal

in diameter.



Bowel: The stomach and visualized portions of the small bowel are unremarkable.

Diffuse diverticulosis coli without associated inflammation.



Lung bases: Unremarkable.



Bones: Normal marrow signal. No focal osseous lesions.



Soft tissues: Fat-containing of focal hernia is not visualized on this exam,

possibly due to differences in slice selection.



IMPRESSION:



1. Stable findings of chronic pancreatitis. No evidence of pancreas mass or

acute pancreatitis.

2. 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 Stable

splenomegaly.

3. Hepatomegaly and portal hypertension. No ascites.

4. No abnormalities of the biliary tree.

5. Diverticulosis coli.





Thank you for your referral. 







Signed by: Dr. Jia Lambert MD on 11/28/2018 8:58 AM

## 2021-02-12 ENCOUNTER — HOSPITAL ENCOUNTER (OUTPATIENT)
Dept: HOSPITAL 88 - MRI | Age: 71
End: 2021-02-12
Attending: INTERNAL MEDICINE
Payer: MEDICARE

## 2021-02-12 DIAGNOSIS — K86.1: Primary | ICD-10-CM

## 2021-02-12 LAB
BUN SERPL-MCNC: 18 MG/DL (ref 7–26)
BUN/CREAT SERPL: 19 (ref 6–25)
EGFRCR SERPLBLD CKD-EPI 2021: > 60 ML/MIN (ref 60–?)

## 2021-02-12 PROCEDURE — 36415 COLL VENOUS BLD VENIPUNCTURE: CPT

## 2021-02-12 PROCEDURE — 82565 ASSAY OF CREATININE: CPT

## 2021-02-12 PROCEDURE — 84520 ASSAY OF UREA NITROGEN: CPT

## 2021-02-12 PROCEDURE — 74183 MRI ABD W/O CNTR FLWD CNTR: CPT

## 2022-06-09 LAB
BASOPHILS # BLD AUTO: 0 10*3/UL (ref 0–0.1)
BASOPHILS NFR BLD AUTO: 0.7 % (ref 0–1)
DEPRECATED NEUTROPHILS # BLD AUTO: 3.7 10*3/UL (ref 2.1–6.9)
EOSINOPHIL # BLD AUTO: 0.1 10*3/UL (ref 0–0.4)
EOSINOPHIL NFR BLD AUTO: 1.8 % (ref 0–6)
ERYTHROCYTE [DISTWIDTH] IN CORD BLOOD: 14 % (ref 11.7–14.4)
HCT VFR BLD AUTO: 47 % (ref 38.2–49.6)
HGB BLD-MCNC: 15.4 G/DL (ref 14–18)
LYMPHOCYTES # BLD: 1.7 10*3/UL (ref 1–3.2)
LYMPHOCYTES NFR BLD AUTO: 28 % (ref 18–39.1)
MCH RBC QN AUTO: 29.6 PG (ref 28–32)
MCHC RBC AUTO-ENTMCNC: 32.8 G/DL (ref 31–35)
MCV RBC AUTO: 90.2 FL (ref 81–99)
MONOCYTES # BLD AUTO: 0.4 10*3/UL (ref 0.2–0.8)
MONOCYTES NFR BLD AUTO: 7.3 % (ref 4.4–11.3)
NEUTS SEG NFR BLD AUTO: 61.9 % (ref 38.7–80)
PLATELET # BLD AUTO: 164 X10E3/UL (ref 140–360)
RBC # BLD AUTO: 5.21 X10E6/UL (ref 4.3–5.7)

## 2022-06-13 ENCOUNTER — HOSPITAL ENCOUNTER (OUTPATIENT)
Dept: HOSPITAL 88 - OR | Age: 72
Discharge: HOME | End: 2022-06-13
Attending: INTERNAL MEDICINE
Payer: MEDICARE

## 2022-06-13 VITALS — SYSTOLIC BLOOD PRESSURE: 94 MMHG | DIASTOLIC BLOOD PRESSURE: 61 MMHG

## 2022-06-13 DIAGNOSIS — Z01.812: ICD-10-CM

## 2022-06-13 DIAGNOSIS — Z79.82: ICD-10-CM

## 2022-06-13 DIAGNOSIS — K21.9: ICD-10-CM

## 2022-06-13 DIAGNOSIS — Z87.891: ICD-10-CM

## 2022-06-13 DIAGNOSIS — Z09: Primary | ICD-10-CM

## 2022-06-13 DIAGNOSIS — Z20.822: ICD-10-CM

## 2022-06-13 DIAGNOSIS — K86.1: ICD-10-CM

## 2022-06-13 DIAGNOSIS — K29.70: ICD-10-CM

## 2022-06-13 DIAGNOSIS — Z79.899: ICD-10-CM

## 2022-06-13 DIAGNOSIS — Z01.810: ICD-10-CM

## 2022-06-13 DIAGNOSIS — K57.30: ICD-10-CM

## 2022-06-13 DIAGNOSIS — Z86.010: ICD-10-CM

## 2022-06-13 DIAGNOSIS — Z79.4: ICD-10-CM

## 2022-06-13 DIAGNOSIS — I10: ICD-10-CM

## 2022-06-13 DIAGNOSIS — E03.9: ICD-10-CM

## 2022-06-13 DIAGNOSIS — E11.9: ICD-10-CM

## 2022-06-13 DIAGNOSIS — K64.8: ICD-10-CM

## 2022-06-13 DIAGNOSIS — K62.89: ICD-10-CM

## 2022-06-13 PROCEDURE — 82948 REAGENT STRIP/BLOOD GLUCOSE: CPT

## 2022-06-13 PROCEDURE — 45378 DIAGNOSTIC COLONOSCOPY: CPT

## 2022-06-13 PROCEDURE — 85025 COMPLETE CBC W/AUTO DIFF WBC: CPT

## 2022-06-13 PROCEDURE — 36415 COLL VENOUS BLD VENIPUNCTURE: CPT

## 2022-06-13 PROCEDURE — 93005 ELECTROCARDIOGRAM TRACING: CPT

## 2022-06-28 ENCOUNTER — HOSPITAL ENCOUNTER (OUTPATIENT)
Dept: HOSPITAL 88 - MRI | Age: 72
End: 2022-06-28
Attending: INTERNAL MEDICINE
Payer: MEDICARE

## 2022-06-28 DIAGNOSIS — K62.9: Primary | ICD-10-CM

## 2022-06-28 LAB
BUN SERPL-MCNC: 16 MG/DL (ref 7–26)
BUN/CREAT SERPL: 16 (ref 6–25)

## 2022-06-28 PROCEDURE — 36415 COLL VENOUS BLD VENIPUNCTURE: CPT

## 2022-06-28 PROCEDURE — 72197 MRI PELVIS W/O & W/DYE: CPT

## 2022-06-28 PROCEDURE — 82565 ASSAY OF CREATININE: CPT

## 2022-06-28 PROCEDURE — 84520 ASSAY OF UREA NITROGEN: CPT
